# Patient Record
Sex: FEMALE | Race: BLACK OR AFRICAN AMERICAN
[De-identification: names, ages, dates, MRNs, and addresses within clinical notes are randomized per-mention and may not be internally consistent; named-entity substitution may affect disease eponyms.]

---

## 2018-10-15 ENCOUNTER — HOSPITAL ENCOUNTER (EMERGENCY)
Dept: HOSPITAL 62 - ER | Age: 23
Discharge: HOME | End: 2018-10-15
Payer: SELF-PAY

## 2018-10-15 VITALS — SYSTOLIC BLOOD PRESSURE: 113 MMHG | DIASTOLIC BLOOD PRESSURE: 69 MMHG

## 2018-10-15 DIAGNOSIS — K05.10: ICD-10-CM

## 2018-10-15 DIAGNOSIS — K02.9: ICD-10-CM

## 2018-10-15 DIAGNOSIS — K08.89: Primary | ICD-10-CM

## 2018-10-15 PROCEDURE — 99282 EMERGENCY DEPT VISIT SF MDM: CPT

## 2018-10-15 NOTE — ER DOCUMENT REPORT
HPI





- HPI


Pain Level: 4


Notes: 





Patient is a 23-year-old female who presents to the ED complaining of Rt lower/

upper dental pain #2/31 x1 day.  She has not noticed any obvious abscess or 

purulent discharge.  Patient states that she is still able to eat and drink, 

but does have a decreased p.o. intake due to the pain.  She has tried some over-

the-counter meds with minimal relief.  Patient believes that there might be a 

break in her #2 tooth that has been there for a long time.  The pain will 

occasionally radiate up towards her ear.  She has not noticed any facial 

swelling.  No other concerns or complaints.  Denies any headache, fever, head 

injury, neck pain, hoarseness, drooling, URI, sore throat, chest pain, 

palpitations, syncope, cough, shortness of breath, wheeze, dyspnea, abdominal 

pain, nausea/vomiting/diarrhea, urinary retention, dysuria, hematuria, or rash.





- ROS


Systems Reviewed and Negative: Yes All other systems reviewed and negative





Past Medical History





- Social History


Smoking Status: Never Smoker


Family History: Reviewed & Not Pertinent





Vertical Provider Document





- CONSTITUTIONAL


Agree With Documented VS: Yes


Notes: 





PHYSICAL EXAMINATION:





GENERAL: Well-appearing, well-nourished and in no acute distress.





HEAD: Atraumatic, normocephalic.





EYES: Pupils equal round and reactive to light, extraocular movements intact, 

sclera anicteric, conjunctiva are normal.





ENT: EAC clear b/l.  TM's intact b/l without erythema, fluid, or perforation.  

Nares patent and without discharge.  oropharynx clear without exudates.  No 

tonsilar hypertrophy or erythema.  Moist mucous membranes.  No sinus 

tenderness.  Uvula midline.  No palatine shift.  No tongue protrusion.  No 

respiratory compromise.





Mouth: + mild decay and mild gingivitis.  No obvious abscess or discharge 

noted.  No facial swelling.  + tenderness to tooth #2/31





NECK: Normal range of motion, supple without lymphadenopathy.  No rigidity/

meningismus.





LUNGS: Breath sounds clear to auscultation bilaterally and equal.  No wheezes 

rales or rhonchi.





HEART: Regular rate and rhythm without murmurs, rubs, gallops.





NEUROLOGICAL: Cranial nerves grossly intact.  Normal speech, normal gait.  

Normal sensory, motor exams 





PSYCH: Normal mood, normal affect.





SKIN: Warm, Dry, normal turgor, no rashes or lesions noted.





- INFECTION CONTROL


TRAVEL OUTSIDE OF THE [place] IN LAST 30 DAYS: No





Course





- Re-evaluation


Re-evalutation: 





10/15/18 13:14


Patient is an afebrile, well-hydrated, 23-year-old female who presents to the 

ED with dental pain, suspect nerve root etiology versus infection.  Vitals are 

acceptable.  PE is otherwise unremarkable.  No I&D, labs, or imaging warranted 

at this time based on H&P.  Viscous lidocaine dispensed today.  I will send her 

home with a prescription for penicillin.  Low suspicion for any meningitis, 

sepsis, peritonsillar/pharyngeal abscess, respiratory compromise, Wayne's, 

temporal arteritis, or other emergent systemic condition at this time.  Patient 

is aware this condition can change from initial presentation and she needs to 

monitor symptoms closely.  Conservative measures otherwise for symptoms.  Call 

to schedule an appointment with a dentist for further evaluation and 

management.  Recheck with your PCM this week as well.  Return to the ED with 

any worsening/concerning symptoms otherwise as reviewed in discharge.  Patient 

is in agreement.





- Vital Signs


Vital signs: 


 











Temp Pulse Resp BP Pulse Ox


 


 97.7 F   59 L  16   113/69   100 


 


 10/15/18 13:04  10/15/18 13:04  10/15/18 13:04  10/15/18 13:04  10/15/18 13:04














Discharge





- Discharge


Clinical Impression: 


 Pain, dental





Condition: Stable


Disposition: HOME, SELF-CARE


Instructions:  Penicillin V K ([facility]), Toothache ([facility])


Additional Instructions: 


Brush and floss twice daily


Maintain fluid intake


Take antibiotics as directed


Mouthwash, salt water gargles, peroxide rinse as needed


Tylenol/ibuprofen as needed


Recheck with PCM this week


Call today/tomorrow and schedule an appointment with your dentist for further 

evaluation


Return to the ED with any worsening symptoms and/or development of fever, 

headache, facial swelling, swelling of lips/tongue/throat, trouble swallowing, 

drooling, hoarseness, neck pain/stiffness, chest pain, palpitations, syncope, 

shortness of breath, trouble breathing, abdominal pain, n/v/d, numbness/tingling

, or other worsening symptoms that are concerning to you.


Prescriptions: 


Penicillin V Potassium [Penicillin Vk 250 mg Tablet] 500 mg PO BID #40 tablet


Forms:  Smoking Cessation Education


Referrals: 


[facility] [Provider Group] - Follow up in 1 week